# Patient Record
Sex: FEMALE | Race: WHITE
[De-identification: names, ages, dates, MRNs, and addresses within clinical notes are randomized per-mention and may not be internally consistent; named-entity substitution may affect disease eponyms.]

---

## 2017-07-23 NOTE — RADIOLOGY REPORT (SQ)
EXAM DESCRIPTION:  CT HEAD WITHOUT



COMPLETED DATE/TIME:  7/23/2017 3:32 pm



REASON FOR STUDY:  SEIZURE, NO PRIOR H/O SAME



COMPARISON:  None.



TECHNIQUE:  Axial images acquired through the brain without intravenous contrast.  Images reviewed wi
th bone, brain and subdural windows.  Images stored on PACS.

All CT scanners at this facility use dose modulation, iterative reconstruction, and/or weight based d
osing when appropriate to reduce radiation dose to as low as reasonably achievable (ALARA).

[facility]: Dose Right  [facility]: CareDose    [facility]: Dose Right    [facility]: Teradose 4D    [facility]: Smart Technologies



RADIATION DOSE:   mGy.



LIMITATIONS:  None.



FINDINGS:  VENTRICLES: Normal size and contour.

CEREBRUM: No masses.  No hemorrhage.  No midline shift.  Normal gray/white matter differentiation.  N
o evidence for acute infarction.

CEREBELLUM: No masses.  No hemorrhage.  No alteration of density.  No evidence for acute infarction.

EXTRAAXIAL SPACES: No fluid collections.  No masses.

ORBITS AND GLOBE: No intra- or extraconal masses.  Normal contour of globe without masses.

CALVARIUM: No fracture.

PARANASAL SINUSES: No fluid or mucosal thickening.

SOFT TISSUES: No mass or hematoma.

OTHER: No other significant finding.



IMPRESSION:  NORMAL BRAIN CT WITHOUT CONTRAST.



TECHNICAL DOCUMENTATION:  JOB ID:  [id number]

Quality ID # 436: Final reports with documentation of one or more dose reduction techniques (e.g., Au
tomated exposure control, adjustment of the mA and/or kV according to patient size, use of iterative 
reconstruction technique)

 2011 [employer]- All Rights Reserved

## 2017-07-23 NOTE — ER DOCUMENT REPORT
ED Seizure





- General


Chief Complaint: Probable Seizure


Stated Complaint: POSSIBLE SEIZURE


Time Seen by Provider: 07/23/17 15:02


Mode of Arrival: Medic


Information source: Patient, Friend, Emergency Med Personnel


TRAVEL OUTSIDE OF THE [place] IN LAST 30 DAYS: No





- HPI


Patient complains to provider of: First seizure


Quality of pain: No pain


Severity: None


Continued on arrival to ED: No


Can details of seizure be obtained/verified: Yes


Episode witnessed (by whom): Yes - SIGNIFICANT OTHER


Preceding symptoms/context: Recent alcohol intake.  denies: Recent illness/fever

, Recent drug use, Sleep deprivation, Missed dose of meds, Changed meds or 

dosage, Somnolence


History of: denies: Brain tumor or mets, CVA, Hydrocephalus, Migraines, TBI, V/

P shunt


Character of seizure: Complete loss/conscious, Generalized shaking, Staring


Post-ictal symptoms: Confusion.  No: Headache


Injuries: None


Treatment PTA: No: Advanced airway, Ativan, Valium


Associated Symptoms: Confusion, Loss consciousness





- Related Data


Allergies/Adverse Reactions: 


 





No Known Allergies Allergy (Verified 07/23/17 14:57)


 











Past Medical History





- General


Information source: Patient





- Social History


Smoking Status: Unknown if Ever Smoked


Frequency of alcohol use: Occasional


Drug Abuse: None


Lives with: Spouse/Significant other


Family History: None


Patient has suicidal ideation: No


Patient has homicidal ideation: No





- Past Medical History


Cardiac Medical History: Reports: None


Pulmonary Medical History: Reports: None


EENT Medical History: Reports: None


Neurological Medical History: Reports: None


Endocrine Medical History: Reports: None


Renal/ Medical History: Reports: None


Malignancy Medical History: Reports: None


GI Medical History: Reports: None


Musculoskeltal Medical History: Reports None


Psychiatric Medical History: Reports: None


Surgical Hx: Negative





Review of Systems





- Review of Systems


Constitutional: Weakness


EENT: No symptoms reported


Cardiovascular: No symptoms reported


Respiratory: No symptoms reported


Gastrointestinal: No symptoms reported


Genitourinary: No symptoms reported


Female Genitourinary: denies: Pregnant


Musculoskeletal: No symptoms reported


Neurological/Psychological: See HPI





Physical Exam





- Vital signs


Vitals: 


 











Temp Pulse Resp BP Pulse Ox


 


 98.9 F   81   16   135/79 H  99 


 


 07/23/17 14:31  07/23/17 14:31  07/23/17 14:31  07/23/17 14:31  07/23/17 14:31











Interpretation: Normal





- General


General appearance: Appears well, Alert


In distress: None





- HEENT


Head: Normocephalic


Eyes: Normal


Conjunctiva: Normal


Pupils: PERRL


Ears: Normal


Nasal: Normal


Mouth/Lips: Normal


Mucous membranes: Normal


Neck: Normal, Supple





- Respiratory


Respiratory status: No respiratory distress


Breath sounds: Normal





- Cardiovascular


Rhythm: Regular


Heart sounds: Normal auscultation


Murmur: No





- Abdominal


Inspection: Normal


Distension: No distension


Bowel sounds: Normal





- Back


Back: Normal





- Extremities


General upper extremity: Normal inspection


General lower extremity: Normal inspection.  No: Edema





- Neurological


Neuro grossly intact: Yes


Cognition: Normal


Orientation: AAOx4





- Psychological


Associated symptoms: Normal affect, Normal mood





- Skin


Skin Temperature: Warm


Skin Moisture: Dry


Skin Color: Normal


Skin Turgor: Elastic





Course





- Re-evaluation


Re-evalutation: 





07/23/17 18:24


PATIENT DENIES COMPLAINTS.  RESULTS OF WORK-UP DISCUSSED.





- Vital Signs


Vital signs: 


 











Temp Pulse Resp BP Pulse Ox


 


 98.9 F   81   16   135/79 H  99 


 


 07/23/17 14:31  07/23/17 14:31  07/23/17 14:31  07/23/17 14:31  07/23/17 14:31














- Laboratory


Result Diagrams: 


 07/23/17 15:40





 07/23/17 15:40


Laboratory results interpreted by me: 


 











  07/23/17 07/23/17 07/23/17





  15:40 15:40 16:58


 


WBC  10.7 H  


 


RBC  3.66 L  


 


Hgb  11.6 L  


 


Hct  33.8 L  


 


RDW  15.3 H  


 


Seg Neuts % (Manual)  92 H  


 


Band Neutrophils %  2 L  


 


Lymphocytes % (Manual)  6 L  


 


Monocytes % (Manual)  0 L  


 


Abs Neuts (Manual)  10.1 H  


 


Abs Monocytes (Manual)  0.0 L  


 


Sodium   134.8 L 


 


BUN   6 L 


 


Creatine Kinase   168 H 


 


Urine Ketones    TRACE H


 


Ur Leukocyte Esterase    SMALL H














- Diagnostic Test


Radiology reviewed: Image reviewed, Reports reviewed





Discharge





- Discharge


Clinical Impression: 


 Seizure





Condition: Stable


Disposition: HOME, SELF-CARE


Instructions:  New Seizure ([facility])


Additional Instructions: 


REST, DRINK PLENTY OF FLUIDS.


AVOID ALL ALCOHOLIC DRINKS.


AVOID ANY MEDICATIONS NOT PRESCRIBED FOR YOU.


GET PLENTY OF SLEEP. 


FOLLOW UP WITH YOUR PRIMARY CARE PROVIDER OR RETURN TO E.R. IF PROBLEMS.

## 2017-08-01 NOTE — ER DOCUMENT REPORT
ED General





- General


Stated Complaint: POSSIBLE SEIZURE


Time Seen by Provider: 08/01/17 22:41


Notes: 


Please note history is limited as the patient is altered.


This is a 42-year-old female with a history of opioid abuse on Suboxone, and 

history of seizures in the setting of drinking and withdrawal who presents with 

generalized tonic clonic seizure 2 at home once before EMS and 1 after EMS 

arrival.  She was given Versed and ambulance resulting in termination of the 

seizures and depressed mental status.  Her boyfriend said that she was here for 

the same recently and has been drinking up until 2 days ago.


TRAVEL OUTSIDE OF THE [place] IN LAST 30 DAYS: No





- Related Data


Allergies/Adverse Reactions: 


 





No Known Allergies Allergy (Verified 07/23/17 14:57)


 











Past Medical History





- General


Information source: Patient





- Social History


Smoking Status: Current Every Day Smoker


Family History: None





Review of Systems





- Review of Systems


Notes: 


REVIEW OF SYSTEMS


Secondary to altered mental status





PHYSICAL EXAMINATION





General: No acute distress, well-nourished


Head: Atraumatic, normocephalic


ENT: Mouth normal, oropharynx moist, no exudates or tonsillar enlargement sided

, contusion and small laceration with controlled bleeding.


Eyes: Conjunctiva normal, pupils equal, lids normal


Neck: No JVD, supple, no guarding


CVS: Normal rate, regular rhythm, no murmurs


Resp: No resp distress, equal and normal breath sounds bilaterally


GI: Nondistended, soft, no tenderness to palpation, no rebound or guarding


Ext: No deformities, no edema, normal range of motion in upper and lower ext


Back: No CVA or midline TTP


Skin: No rash, warm


Lymphatic: No lymphadeopathy noted


Neuro: Somnolent and snoring.  Opens eyes and localizes pain to sternal rub..





Course





- Re-evaluation


Re-evalutation: 


08/01/17 23:26


Patient presents with seizures generalized tonoclonic 2 in the setting of 

cessation of alcohol.  Per boyfriend she also takes Suboxone and Wellbutrin but 

he has a low suspicion of overdose intentionally.  She did not report any 

suicidal ideation.  This is happened before when she try to stop drinking.  She 

has been to rehab multiple times.  There is no trauma and her glucose is 

normal.  Will do a EKG and metabolic workup.  She will be observed carefully.





Approximately 1 11:20 PM is called to bedside as the patient was having another 

seizure.  She could be having generalized tonic-clonic jerking movements, was 

unresponsive to verbal stimuli and had a desaturation event to 93.  She was 

given 1 mg Ativan with cessation of the seizure.





08/02/17 00:59


Awake now and alert.  She is no nystagmus.  She is able to recount the prior day

's events.  She states that she did stop alcohol and has been in rehab twice 

both in [place] and [place].  Spoke with her, her partner, and her father 

about options of treatment.  Given her ability and access to inpatient 

supervised detox I do not think she merits a Librium taper however she and her 

partner seem motivated to seek i patient rehab so we will give him referrals 

and contact him with social work.





Patient is clinically sober, her workup has been negative thus far and given 

her normal mental status and lack of signs of withdrawal (no tongue 

fasciculations and no tremor) she is stable for discharge home.


I have discussed with the patient there likely diagnosis, aftercare plan, follow

-up plans and my usual and customary return precautions.  They verbalized 

understanding of this.





- Laboratory


Result Diagrams: 


 08/02/17 00:02





 08/01/17 22:39


Laboratory results interpreted by me: 


 











  08/01/17 08/02/17





  22:39 00:02


 


RDW   15.9 H


 


Seg Neutrophils %   89.1 H


 


Lymphocytes %   7.4 L


 


Carbon Dioxide  13 L 


 


Anion Gap  24 H 


 


Glucose  141 H 














Critical Care Note





- Critical Care Note


Total time excluding time spent on procedures (mins): 32


Comments: 


The above patient is critically ill.  Not including procedures, but including 

direct re-evaluations, speaking with patient and/or consultants, interpreting 

results, and documenting, I spent the total amount of minute listed listed 

above on critical care time





Discharge





- Discharge


Clinical Impression: 


Alcohol withdrawal seizure


Qualifiers:


 Complication of substance-induced condition: with delirium Qualified Code(s): 

F10.231 - Alcohol dependence with withdrawal delirium





Condition: Good


Disposition: HOME, SELF-CARE


Instructions:  Alcohol Withdrawl ([facility])


Referrals: 


[facility] [Provider Group] - Follow up in 3-5 days


[facility] [Provider Group] - Follow up in 3-5 days

## 2017-08-01 NOTE — RADIOLOGY REPORT (SQ)
EXAM DESCRIPTION:  CT HEAD WITHOUT



COMPLETED DATE/TIME:  8/1/2017 11:10 pm



REASON FOR STUDY:  sz, r/o bleed



COMPARISON:  7/23/2017



TECHNIQUE:  Axial images acquired through the brain without intravenous contrast.  Images reviewed wi
th bone, brain and subdural windows.  Images stored on PACS.

All CT scanners at this facility use dose modulation, iterative reconstruction, and/or weight based d
osing when appropriate to reduce radiation dose to as low as reasonably achievable (ALARA).

[facility]: Dose Right  [facility]: CareDose    [facility]: Dose Right    [facility]: Teradose 4D    [facility]: [employer]



RADIATION DOSE:  Up-to-date CT equipment and radiation dose reduction techniques were employed. CTDIv
ol: 49.0 mGy. DLP: 979 mGy-cm. mGy.



LIMITATIONS:  None.



FINDINGS:  VENTRICLES: Normal size and contour.

CEREBRUM: No masses.  No hemorrhage.  No midline shift.  Normal gray/white matter differentiation.  N
o evidence for acute infarction.

CEREBELLUM: No masses.  No hemorrhage.  No alteration of density.  No evidence for acute infarction.

EXTRAAXIAL SPACES: No fluid collections.  No masses.

ORBITS AND GLOBE: No intra- or extraconal masses.  Normal contour of globe without masses.

CALVARIUM: No fracture.

PARANASAL SINUSES: No fluid or mucosal thickening.

SOFT TISSUES: No mass or hematoma.

OTHER: No other significant finding.



IMPRESSION:  NORMAL BRAIN CT WITHOUT CONTRAST.



TECHNICAL DOCUMENTATION:  JOB ID:  [id number]

Quality ID # 436: Final reports with documentation of one or more dose reduction techniques (e.g., Au
tomated exposure control, adjustment of the mA and/or kV according to patient size, use of iterative 
reconstruction technique)

 2011 [employer]- All Rights Reserved

## 2017-08-02 NOTE — PDOC H&P
History of Present Illness


Admission Date/PCP: 


  08/02/17 04:46





  





History of Present Illness: 


PAO PAGE is a 42 year old female,Patient was brought to the emergency 

room today for evaluation of altered mental status, in the emergency room she 

had a witnessed seizure.  Patient have  a long history of alcohol abuse, she is 

new to our practice, she was seen last month for the first time to establish 

with us, she stated that she just moved from [place] to [facility].  I spoke to patient's partner/boyfriend,he stated that patient is 

fond of binge drinking that she will go without alcohol for a long while and 

then she will go on binge drinking   the alcohol of choice is hard liquor  

versus rubbing alcohol.  The alcohol level was low, so it seemed that she is 

having alcohol withdrawal syndrome.Very minimal history could be obtained from 

this patient, most of the history is from the boyfriend








Past Medical History


Psychiatric Medical History: Reports: Alcohol Dependency, Depression





Social History


Smoking Status: Current Every Day Smoker


Cigarettes Packs Per Day: 0.5


Number of Years Smoking: 15


Frequency of Alcohol Use: Heavy


Hx Recreational Drug Use: No


Hx Prescription Drug Abuse: Yes





- Advance Directive


Resuscitation Status: Full Code





Family History


Family History: None


Parental Family History Reviewed: Yes


Children Family History Reviewed: Yes


Sibling(s) Family History Reviewed.: Yes





Medication/Allergy


Home Medications: 








Buprenorphine HCl/Naloxone HCl [Suboxone 8 mg-2 mg Sl Film] 1 each SL Q8 08/02/ 17 


Bupropion HCl [Wellbutrin Sr 150 mg Tablet] 1 tab PO Q12 08/02/17 


Buspirone HCl [Buspar 30 mg Tablet] 1 tab PO DAILY 08/02/17 


Gabapentin [Neurontin] 600 mg PO Q6 08/02/17 


Mirtazapine 30 mg PO QHS 08/02/17 


Pregabalin [Lyrica 50 Mg Capsule] 50 mg PO Q12 08/02/17 


Topiramate [Topamax] 50 mg PO Q12 08/02/17 








Allergies/Adverse Reactions: 


 





No Known Allergies Allergy (Verified 08/02/17 09:12)


 











Review of Systems


ROS unobtainable: Due to mental status





Physical Exam


Vital Signs: 


 











Temp Pulse Resp BP Pulse Ox


 


 99.5 F   94   16   134/79 H  100 


 


 08/02/17 15:20  08/02/17 15:20  08/02/17 15:20  08/02/17 15:20  08/02/17 15:20








 Intake & Output











 08/01/17 08/02/17 08/03/17





 06:59 06:59 06:59


 


Weight  67.2 kg 68.5 kg











General appearance: PRESENT: other - Stuporous


Head exam: PRESENT: atraumatic, normocephalic


Eye exam: PRESENT: PERRLA.  ABSENT: scleral icterus


Ear exam: PRESENT: normal external ear exam


Mouth exam: PRESENT: moist, tongue midline


Neck exam: PRESENT: full ROM


Respiratory exam: PRESENT: clear to auscultation anamaria


Cardiovascular exam: PRESENT: RRR, +S1, +S2


GI/Abdominal exam: PRESENT: normal bowel sounds, soft


Rectal exam: PRESENT: deferred


Neurological exam: PRESENT: altered





Results


Laboratory Results: 


 











  08/02/17





  15:52


 


Lactic Acid  1.4











Impressions: 


 





Head CT  08/01/17 22:41


IMPRESSION:  NORMAL BRAIN CT WITHOUT CONTRAST.


 














Assessment & Plan





- Diagnosis


(1) Alcohol withdrawal seizure


Qualifiers: 


     Complication of substance-induced condition: with delirium     Qualified 

Code(s): F10.231 - Alcohol dependence with withdrawal delirium  


Is this a current diagnosis for this admission?: YesPlan: 


Patient is admitted for management she will be treated with intravenous 

lorazepam, for 24 hours then we reevaluate.

## 2017-08-03 NOTE — PDOC PROGRESS REPORT
Subjective


Progress Note for:: 08/03/17


Subjective:: 


Patient was seen by the bedside she is more alert, awake and responsive today, 

she was seen by the psychiatrist as well today.  She was n.p.o. since admission

, she can eat regular diet, hopefully discharge home  tomorrow





Physical Exam


Vital Signs: 


 











Temp Pulse Resp BP Pulse Ox


 


 98.7 F   84   16   124/68   100 


 


 08/03/17 19:31  08/03/17 20:04  08/03/17 19:31  08/03/17 19:31  08/03/17 19:31








 Intake & Output











 08/02/17 08/03/17 08/04/17





 06:59 06:59 06:59


 


Intake Total  2561 700


 


Output Total  750 


 


Balance  1811 700


 


Weight 67.2 kg 70.9 kg 











General appearance: PRESENT: no acute distress, well-developed, well-nourished


Head exam: PRESENT: atraumatic, normocephalic


Eye exam: PRESENT: conjunctiva pink, EOMI, PERRLA


Ear exam: PRESENT: normal external ear exam


Mouth exam: PRESENT: moist, tongue midline


Neck exam: PRESENT: full ROM


Respiratory exam: PRESENT: clear to auscultation anamaria


Cardiovascular exam: PRESENT: RRR, +S1, +S2


Pulses: PRESENT: normal dorsalis pedis pul, +2 pedal pulses bilateral


Vascular exam: PRESENT: normal capillary refill


GI/Abdominal exam: PRESENT: normal bowel sounds, soft


Rectal exam: PRESENT: deferred


Neurological exam: PRESENT: alert, CN II-XII grossly intact


Psychiatric exam: PRESENT: appropriate affect, normal mood


Skin exam: PRESENT: dry, intact, warm





Results


Laboratory Results: 


 





 08/03/17 05:25 





 08/03/17 05:25 





 











  08/03/17 08/03/17





  05:25 05:25


 


WBC  9.8 


 


RBC  3.51 L 


 


Hgb  11.3 L 


 


Hct  32.8 L 


 


MCV  94 


 


MCH  32.2 


 


MCHC  34.4 


 


RDW  15.8 H 


 


Plt Count  221 


 


Seg Neutrophils %  78.5 H 


 


Lymphocytes %  16.1 


 


Monocytes %  4.8 


 


Eosinophils %  0.2 


 


Basophils %  0.4 


 


Absolute Neutrophils  7.7 


 


Absolute Lymphocytes  1.6 


 


Absolute Monocytes  0.5 


 


Absolute Eosinophils  0.0 


 


Absolute Basophils  0.0 


 


Sodium   134.8 L


 


Potassium   4.0


 


Chloride   102


 


Carbon Dioxide   23


 


Anion Gap   10


 


BUN   8


 


Creatinine   0.63


 


Est GFR ( Amer)   > 60


 


Est GFR (Non-Af Amer)   > 60


 


Glucose   108


 


Calcium   9.0


 


Total Bilirubin   0.6


 


AST   15


 


ALT   19


 


Alkaline Phosphatase   59


 


Total Protein   6.5


 


Albumin   3.9











Impressions: 


 





Head CT  08/01/17 22:41


IMPRESSION:  NORMAL BRAIN CT WITHOUT CONTRAST.


 














Assessment & Plan





- Diagnosis


(1) Alcohol withdrawal seizure


Qualifiers: 


     Complication of substance-induced condition: with delirium     Qualified 

Code(s): F10.231 - Alcohol dependence with withdrawal delirium  


Is this a current diagnosis for this admission?: Yes

## 2017-08-03 NOTE — PSYCHOLOGICAL NOTE
Psych Note





- Psych Note


Psych Note: 


This is a 42-year-old female with a history of opioid abuse on Suboxone, and 

history of seizures in the setting of drinking and withdrawal who presents with 

generalized tonic clonic seizure 2 at home once before EMS and 1 after EMS 

arrival.  Psychiatric evaluation was requested for concern of alcohol abuse.





Patient stated she would like information on both in patient and out patient 

substance abuse treatment for alcohol.  Patient denies wanted to harm herself 

or killing herself.  Patient stated she does have a drinking problem and has 

seizures from withdrawal.  Patient  disclosed her family is also helping her 

plan her treatment.  





Patient is alert and orientated to person, place, time and circumstance.  Mood 

is lethargic with flat affect;  patient is currently receiving Ativan for 

treatment of alcohol withdrawal. Patient denies suicidal and homicidal 

ideation.  Delusions were absent and behaviour is congruent with an intact 

reality based presentation (i.e. organized, linear,rational thinking).  Eye 

contact was well maintained. Attention and concentration was fair.  Insight, 

judgement and impulse control appears to be fair. 





303.90 (F10.20) Alcohol Abuse; severe 





Impression/plan: Patient is considered psychiatrically cleared.  Patient does 

not meet IVC criteria per [place] GS 122C.  Patient denies suicidal and homicidal 

ideation. Delusions were absent and behaviour is congruent with an intact 

reality based presentation (i.e. organized, linear,rational thinking).  Patient 

is recommended to receive substance abuse treatment; resources were provided.  

Dr. Whiteside was consulted on the care and management of this patient.

## 2017-08-04 NOTE — PDOC DISCHARGE SUMMARY
General





- Admit/Disc Date/PCP


Admission Date/Primary Care Provider: 


  08/02/17 04:46





  





Discharge Date: 08/04/17





- Discharge Diagnosis


(1) Alcohol withdrawal seizure


Is this a current diagnosis for this admission?: Yes








- Additional Information


Resuscitation Status: Full Code


Discharge Diet: Regular


Discharge Activity: Activity As Tolerated


Home Medications: 








Buprenorphine HCl/Naloxone HCl [Suboxone 8 mg-2 mg Sl Film] 1 each SL Q8 08/02/ 17 


Bupropion HCl [Wellbutrin Sr 150 mg Tablet] 1 tab PO Q12 08/02/17 


Buspirone HCl [Buspar 30 mg Tablet] 1 tab PO DAILY 08/02/17 


Mirtazapine 30 mg PO QHS 08/02/17 


Pregabalin [Lyrica 50 mg Capsule] 50 mg PO Q12 08/02/17 


Topiramate [Topamax] 50 mg PO Q12 08/02/17 











History of Present Illness


History of Present Illness: 


PAO PAGE is a 42 year old female,Patient was brought to the emergency 

room today for evaluation of altered mental status, in the emergency room she 

had a witnessed seizure.  Patient have  a long history of alcohol abuse, she is 

new to our practice, she was seen last month for the first time to establish 

with us, she stated that she just moved from [place] to [facility].  I spoke to patient's partner/boyfriend,he stated that patient is 

fond of binge drinking that she will go without alcohol for a long while and 

then she will go on binge drinking   the alcohol of choice is hard liquor  

versus rubbing alcohol.  The alcohol level was low, so it seemed that she is 

having alcohol withdrawal syndrome.Very minimal history could be obtained from 

this patient, most of the history is from the [facility]








Hospital Course


Hospital Course: 


Patient was admitted for the management of alcohol related  seizure, she was 

treated with lorazepam intravenously, was kept n.p.o. for 24 hours.  She was 

then transitioned to as needed lorazepam, she is stable enough today she is 

awake and responsive she will be discharged home.





Physical Exam


Vital Signs: 


 











Temp Pulse Resp BP Pulse Ox


 


 98.4 F   64   16   129/57 H  100 


 


 08/04/17 17:50  08/04/17 17:50  08/04/17 17:50  08/04/17 17:50  08/04/17 17:50








 Intake & Output











 08/03/17 08/04/17 08/05/17





 06:59 06:59 06:59


 


Intake Total 2561 1806 1300


 


Output Total 750 0 


 


Balance 1811 1806 1300


 


Weight 70.9 kg 70.9 kg 











General appearance: PRESENT: no acute distress, well-developed, well-nourished


Head exam: PRESENT: atraumatic, normocephalic


Eye exam: PRESENT: conjunctiva pink, EOMI, PERRLA


Ear exam: PRESENT: normal external ear exam


Mouth exam: PRESENT: moist, tongue midline


Neck exam: PRESENT: full ROM


Cardiovascular exam: PRESENT: RRR, +S1, +S2


Pulses: PRESENT: normal dorsalis pedis pul, +2 pedal pulses bilateral


Vascular exam: PRESENT: normal capillary refill


GI/Abdominal exam: PRESENT: normal bowel sounds, soft


Rectal exam: PRESENT: deferred


Neurological exam: PRESENT: alert, awake, oriented to person, oriented to place

, oriented to time, oriented to situation, CN II-XII grossly intact


Psychiatric exam: PRESENT: appropriate affect, normal mood


Skin exam: PRESENT: dry, intact, warm





Results


Laboratory Results: 


 





 08/03/17 05:25 





 08/03/17 05:25 








Impressions: 


 





Head CT  08/01/17 22:41


IMPRESSION:  NORMAL BRAIN CT WITHOUT CONTRAST.

## 2018-10-27 ENCOUNTER — HOSPITAL ENCOUNTER (INPATIENT)
Dept: HOSPITAL 62 - ER | Age: 43
LOS: 3 days | Discharge: LEFT BEFORE DISCHARGE | DRG: 894 | End: 2018-10-30
Attending: INTERNAL MEDICINE | Admitting: INTERNAL MEDICINE
Payer: COMMERCIAL

## 2018-10-27 DIAGNOSIS — F10.231: Primary | ICD-10-CM
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
## 2018-10-27 LAB
ADD MANUAL DIFF: NO
ALBUMIN SERPL-MCNC: 3.6 G/DL (ref 3.5–5)
ALP SERPL-CCNC: 51 U/L (ref 38–126)
ALT SERPL-CCNC: 12 U/L (ref 9–52)
ANION GAP SERPL CALC-SCNC: 13 MMOL/L (ref 5–19)
APAP SERPL-MCNC: < 10 UG/ML (ref 10–30)
APPEARANCE UR: CLEAR
APTT PPP: (no result) S
AST SERPL-CCNC: 19 U/L (ref 14–36)
BARBITURATES UR QL SCN: NEGATIVE
BASOPHILS # BLD AUTO: 0 10^3/UL (ref 0–0.2)
BASOPHILS NFR BLD AUTO: 0.5 % (ref 0–2)
BILIRUB DIRECT SERPL-MCNC: 0.1 MG/DL (ref 0–0.4)
BILIRUB SERPL-MCNC: 0.2 MG/DL (ref 0.2–1.3)
BILIRUB UR QL STRIP: NEGATIVE
BUN SERPL-MCNC: 6 MG/DL (ref 7–20)
CALCIUM: 8.7 MG/DL (ref 8.4–10.2)
CHLORIDE SERPL-SCNC: 110 MMOL/L (ref 98–107)
CO2 SERPL-SCNC: 21 MMOL/L (ref 22–30)
EOSINOPHIL # BLD AUTO: 0 10^3/UL (ref 0–0.6)
EOSINOPHIL NFR BLD AUTO: 0.1 % (ref 0–6)
ERYTHROCYTE [DISTWIDTH] IN BLOOD BY AUTOMATED COUNT: 15.2 % (ref 11.5–14)
ETHANOL SERPL-MCNC: < 10 MG/DL
GLUCOSE SERPL-MCNC: 117 MG/DL (ref 75–110)
GLUCOSE UR STRIP-MCNC: NEGATIVE MG/DL
HCT VFR BLD CALC: 35.1 % (ref 36–47)
HGB BLD-MCNC: 12.1 G/DL (ref 12–15.5)
KETONES UR STRIP-MCNC: NEGATIVE MG/DL
LYMPHOCYTES # BLD AUTO: 0.6 10^3/UL (ref 0.5–4.7)
LYMPHOCYTES NFR BLD AUTO: 8 % (ref 13–45)
MCH RBC QN AUTO: 31.3 PG (ref 27–33.4)
MCHC RBC AUTO-ENTMCNC: 34.4 G/DL (ref 32–36)
MCV RBC AUTO: 91 FL (ref 80–97)
METHADONE UR QL SCN: (no result)
MONOCYTES # BLD AUTO: 0.2 10^3/UL (ref 0.1–1.4)
MONOCYTES NFR BLD AUTO: 3 % (ref 3–13)
NEUTROPHILS # BLD AUTO: 6.6 10^3/UL (ref 1.7–8.2)
NEUTS SEG NFR BLD AUTO: 88.4 % (ref 42–78)
NITRITE UR QL STRIP: NEGATIVE
PCP UR QL SCN: NEGATIVE
PH UR STRIP: 7 [PH] (ref 5–9)
PLATELET # BLD: 230 10^3/UL (ref 150–450)
POTASSIUM SERPL-SCNC: 3.8 MMOL/L (ref 3.6–5)
PROT SERPL-MCNC: 6.3 G/DL (ref 6.3–8.2)
PROT UR STRIP-MCNC: NEGATIVE MG/DL
RBC # BLD AUTO: 3.86 10^6/UL (ref 3.72–5.28)
SALICYLATES SERPL-MCNC: < 1 MG/DL (ref 2–20)
SODIUM SERPL-SCNC: 144.4 MMOL/L (ref 137–145)
SP GR UR STRIP: 1.01
TOTAL CELLS COUNTED % (AUTO): 100 %
URINE AMPHETAMINES SCREEN: NEGATIVE
URINE BENZODIAZEPINES SCREEN: (no result)
URINE COCAINE SCREEN: NEGATIVE
URINE MARIJUANA (THC) SCREEN: NEGATIVE
UROBILINOGEN UR-MCNC: NEGATIVE MG/DL (ref ?–2)
WBC # BLD AUTO: 7.5 10^3/UL (ref 4–10.5)

## 2018-10-27 PROCEDURE — 96375 TX/PRO/DX INJ NEW DRUG ADDON: CPT

## 2018-10-27 PROCEDURE — 96365 THER/PROPH/DIAG IV INF INIT: CPT

## 2018-10-27 PROCEDURE — 70450 CT HEAD/BRAIN W/O DYE: CPT

## 2018-10-27 PROCEDURE — 93010 ELECTROCARDIOGRAM REPORT: CPT

## 2018-10-27 PROCEDURE — 80307 DRUG TEST PRSMV CHEM ANLYZR: CPT

## 2018-10-27 PROCEDURE — 81001 URINALYSIS AUTO W/SCOPE: CPT

## 2018-10-27 PROCEDURE — 36415 COLL VENOUS BLD VENIPUNCTURE: CPT

## 2018-10-27 PROCEDURE — 80048 BASIC METABOLIC PNL TOTAL CA: CPT

## 2018-10-27 PROCEDURE — 83735 ASSAY OF MAGNESIUM: CPT

## 2018-10-27 PROCEDURE — 3E0F73Z INTRODUCTION OF ANTI-INFLAMMATORY INTO RESPIRATORY TRACT, VIA NATURAL OR ARTIFICIAL OPENING: ICD-10-PCS | Performed by: INTERNAL MEDICINE

## 2018-10-27 PROCEDURE — 80053 COMPREHEN METABOLIC PANEL: CPT

## 2018-10-27 PROCEDURE — 85025 COMPLETE CBC W/AUTO DIFF WBC: CPT

## 2018-10-27 PROCEDURE — 93005 ELECTROCARDIOGRAM TRACING: CPT

## 2018-10-27 PROCEDURE — 84703 CHORIONIC GONADOTROPIN ASSAY: CPT

## 2018-10-27 PROCEDURE — 99285 EMERGENCY DEPT VISIT HI MDM: CPT

## 2018-10-27 PROCEDURE — 82962 GLUCOSE BLOOD TEST: CPT

## 2018-10-27 RX ADMIN — GABAPENTIN SCH MG: 400 CAPSULE ORAL at 17:48

## 2018-10-27 RX ADMIN — HEPARIN SODIUM SCH: 5000 INJECTION, SOLUTION INTRAVENOUS; SUBCUTANEOUS at 14:29

## 2018-10-27 RX ADMIN — SODIUM CHLORIDE PRN MLS/HR: 9 INJECTION, SOLUTION INTRAVENOUS at 10:07

## 2018-10-27 RX ADMIN — DOCUSATE SODIUM SCH MG: 100 CAPSULE, LIQUID FILLED ORAL at 09:58

## 2018-10-27 RX ADMIN — HEPARIN SODIUM SCH UNIT: 5000 INJECTION, SOLUTION INTRAVENOUS; SUBCUTANEOUS at 21:14

## 2018-10-27 RX ADMIN — DOCUSATE SODIUM SCH MG: 100 CAPSULE, LIQUID FILLED ORAL at 17:48

## 2018-10-27 RX ADMIN — LORAZEPAM PRN MG: 2 INJECTION INTRAMUSCULAR; INTRAVENOUS at 11:59

## 2018-10-27 RX ADMIN — HEPARIN SODIUM SCH UNIT: 5000 INJECTION, SOLUTION INTRAVENOUS; SUBCUTANEOUS at 06:47

## 2018-10-27 RX ADMIN — SODIUM CHLORIDE PRN MLS/HR: 9 INJECTION, SOLUTION INTRAVENOUS at 14:28

## 2018-10-27 RX ADMIN — LORAZEPAM PRN MG: 2 INJECTION INTRAMUSCULAR; INTRAVENOUS at 19:42

## 2018-10-27 RX ADMIN — LORAZEPAM PRN MG: 2 INJECTION INTRAMUSCULAR; INTRAVENOUS at 16:21

## 2018-10-27 NOTE — EKG REPORT
SEVERITY:- BORDERLINE ECG -

SINUS RHYTHM

ATRIAL PREMATURE COMPLEX

BORDERLINE PROLONGED QT INTERVAL

:

Confirmed by: Konrad Cornelius 27-Oct-2018 14:18:36

## 2018-10-27 NOTE — PDOC H&P
History of Present Illness


Admission Date/PCP: 


  





  AIRAM PEREZ





Patient complains of: Seizure


History of Present Illness: 


LINWOOD PAGE is a 43 year old female with a past medical history of alcohol 

binging, alcohol withdrawal seizure and depression.  History is obtained by the 

record as she is sedated.  Patient presents after 3 seizures following abrupt 

alcohol cessation, last drink 48 hours ago.  EMS witnessed a seizure for which 

she received Versed, she was combative in postictal state prompting Haldol and 

Benadryl.  She denies fever, headache, recent illness.  She admits to remote 

discontinuation of Keppra.


In the emergency room she has another witnessed seizure with urinary 

incontinence and tongue biting, prompting 2 mg of Ativan and subsequently 

aborted within 1 minute.  





Past Medical History


Psychiatric Medical History: Reports: Alcohol Dependency, Depression





Social History


Information Source: Patient, Emergency Med Personnel, [facility] Records


Smoking Status: Unknown if Ever Smoked


Frequency of Alcohol Use: Heavy


Hx Recreational Drug Use: No


Hx Prescription Drug Abuse: Yes





- Advance Directive


Resuscitation Status: Full Code





Family History


Family History: Other - Unobtainable


Parental Family History Reviewed: No - Unobtainable


Children Family History Reviewed: No


Sibling(s) Family History Reviewed.: No





Medication/Allergy


Home Medications: 








Buprenorphine HCl/Naloxone HCl [Suboxone 8 mg-2 mg Sl Film] 1 each SL Q8 08/02/ 17 


Bupropion HCl [Wellbutrin Sr 150 mg Tablet] 1 tab PO Q12 08/02/17 


Buspirone HCl [Buspar 30 mg Tablet] 1 tab PO DAILY 08/02/17 


Mirtazapine 30 mg PO QHS 08/02/17 


Pregabalin [Lyrica 50 mg Capsule] 50 mg PO Q12 08/02/17 


Topiramate [Topamax] 50 mg PO Q12 08/02/17 








Allergies/Adverse Reactions: 


 





No Known Allergies Allergy (Verified 08/02/17 09:12)


 











Review of Systems


ROS unobtainable: Due to mental status





Physical Exam


Vital Signs: 


 











Temp Pulse Resp BP Pulse Ox


 


       16   118/69   99 


 


       10/27/18 06:01  10/27/18 06:01  10/27/18 05:01








 Intake & Output











 10/25/18 10/26/18 10/27/18





 11:59 11:59 11:59


 


Intake Total   100


 


Balance   100











General appearance: PRESENT: disheveled, mild distress.  ABSENT: no acute 

distress, cooperative


Head exam: PRESENT: atraumatic, normocephalic


Eye exam: PRESENT: conjunctiva pink, EOMI, PERRLA.  ABSENT: scleral icterus


Ear exam: PRESENT: normal external ear exam


Mouth exam: PRESENT: moist, tongue midline


Neck exam: ABSENT: carotid bruit, JVD, lymphadenopathy, thyromegaly


Respiratory exam: PRESENT: clear to auscultation anamaria.  ABSENT: rales, rhonchi, 

wheezes


Cardiovascular exam: PRESENT: RRR.  ABSENT: diastolic murmur, rubs, systolic 

murmur


Pulses: PRESENT: normal dorsalis pedis pul


Vascular exam: PRESENT: normal capillary refill


GI/Abdominal exam: PRESENT: normal bowel sounds, soft.  ABSENT: distended, 

guarding, mass, organolmegaly, rebound, tenderness


Rectal exam: PRESENT: deferred


Extremities exam: PRESENT: full ROM.  ABSENT: calf tenderness, clubbing, pedal 

edema


Neurological exam: PRESENT: altered, CN II-XII grossly intact


Psychiatric exam: PRESENT: appropriate affect, normal mood.  ABSENT: homicidal 

ideation, suicidal ideation


Skin exam: PRESENT: dry, intact, warm.  ABSENT: cyanosis, rash





Results


Laboratory Results: 


 





 10/27/18 03:35 





 10/27/18 03:35 





 











  10/27/18 10/27/18 10/27/18





  03:35 03:35 03:35


 


WBC  7.5  


 


RBC  3.86  


 


Hgb  12.1  


 


Hct  35.1 L  


 


MCV  91  


 


MCH  31.3  


 


MCHC  34.4  


 


RDW  15.2 H  


 


Plt Count  230  


 


Seg Neutrophils %  88.4 H  


 


Lymphocytes %  8.0 L  


 


Monocytes %  3.0  


 


Eosinophils %  0.1  


 


Basophils %  0.5  


 


Absolute Neutrophils  6.6  


 


Absolute Lymphocytes  0.6  


 


Absolute Monocytes  0.2  


 


Absolute Eosinophils  0.0  


 


Absolute Basophils  0.0  


 


Sodium   144.4 


 


Potassium   3.8 


 


Chloride   110 H 


 


Carbon Dioxide   21 L 


 


Anion Gap   13 


 


BUN   6 L 


 


Creatinine   0.70 


 


Est GFR ( Amer)   > 60 


 


Est GFR (Non-Af Amer)   > 60 


 


Glucose   117 H 


 


Calcium   8.7 


 


Magnesium   2.1 


 


Total Bilirubin   0.2 


 


AST   19 


 


ALT   12 


 


Alkaline Phosphatase   51 


 


Total Protein   6.3 


 


Albumin   3.6 


 


Serum HCG, Qual    NEGATIVE











Impressions: 


 





Head CT  10/27/18 03:42


IMPRESSION: No acute intracranial abnormality. 


 














Assessment & Plan





- Diagnosis


(1) Alcohol dependence


Is this a current diagnosis for this admission?: Yes   


Plan: 


Supportive care, thiamine and folate, Ativan as needed








(2) Alcohol withdrawal seizure


Qualifiers: 


   Complication of substance-induced condition: with delirium   Qualified Code(s

): F10.231 - Alcohol dependence with withdrawal delirium   


Is this a current diagnosis for this admission?: Yes   


Plan: 


Keppra initiated, Ativan as needed








- Time


Time Spent: 30 to 50 Minutes





- Inpatient Certification


Medical Necessity: Need Close Monitoring Due to Risk of Patient Decompensation

## 2018-10-27 NOTE — RADIOLOGY REPORT (SQ)
CT head without contrast on  10/27/2018 at 3:56 AM



CLINICAL INDICATION: Seizure



TECHNIQUE: Multiple axial images are obtained throughout the head

without the administration of contrast. This exam was performed

according to our departmental dose-optimization program, which

includes automated exposure control, adjustment of the mA and/or

kV according to patient size and/or use of iterative

reconstruction technique. 

Total DLP is 1096.98 mGy*cm.



COMPARISON: None currently available



FINDINGS:   There is no hydrocephalus. There is no CT evidence of

acute infarct. There is no hemorrhage. There are no abnormal

extra-axial fluid collections. There is no mass, mass effect or

midline shift. No bony abnormality is noted.



IMPRESSION: No acute intracranial abnormality.

## 2018-10-27 NOTE — ER DOCUMENT REPORT
ED General





- General


Stated Complaint: POSSIBLE SEIZURE


Time Seen by Provider: 10/27/18 03:37


Notes: 





Patient is a 43-year-old female presents with complaint of seizure.  Patient's 

had 3 seizures today.  One was witnessed by the paramedics.  They gave her 10 

of Versed IM as well as 50 Benadryl and 5 of Haldol as patient was combative 

after the seizure.  No recent fevers or infections.  Patient is able to 

communicate with me well at this time.  She says that the only previous history 

of seizures was due to alcohol withdrawal.  She does admit to recent binge 

drinking which she stopped 2 days ago.  She thinks these are probably related 

to alcohol withdrawal as well.  She supposed to take Keppra for seizures but 

says that she has not taken out a long time.  Looked up her previous admission 

for the same thing where she was seen by Dr. Rodriguez in the past but patient 

says she only saw him one time is not seen him in over a year and does not have 

a current primary care doctor.  She denies any recent fevers or infections.  

She denies any trauma or injuries.


TRAVEL OUTSIDE OF THE [place] IN LAST 30 DAYS: No





- Related Data


Allergies/Adverse Reactions: 


 





No Known Allergies Allergy (Verified 08/02/17 09:12)


 











Past Medical History





- Social History


Smoking Status: Unknown if Ever Smoked


Frequency of alcohol use: Heavy


Drug Abuse: None


Family History: None


Psychiatric Medical History: Reports: Hx Depression





Review of Systems





- Review of Systems


Notes: 





My Normal Review Basic





REVIEW OF SYSTEMS:


CONSTITUTIONAL :  Denies fever,  chills, or sweats.  Denies recent illness.


EENT:   Denies eye, ear, throat, or mouth pain or symptoms.  Denies nasal or 

sinus congestion.


CARDIOVASCULAR:  Denies chest pain.


RESPIRATORY:  Denies cough, cold, or chest congestion.  Denies shortness of 

breath, difficulty breathing, or wheezing.


GASTROINTESTINAL:  Denies abdominal pain.  Denies nausea, vomiting, or 

diarrhea. 


MUSCULOSKELETAL:  Denies neck or back pain or joint pain or swelling.


SKIN:   Denies rash or skin lesions.


NEUROLOGICAL: Seizure followed by altered mental status.


ALL OTHER SYSTEMS REVIEWED AND NEGATIVE.





Physical Exam





- Notes


Notes: 





General Appearance: Well nourished, lately somnolent, cooperative, no acute 

distress, no obvious discomfort.  Well-appearing.


Vitals: reviewed, See vital signs table.


Head: no swelling or tenderness to the head


Eyes: PERRL, EOMI, Conjuctiva clear


Mouth: No decreasd moisture


Lungs: No wheezing, No rales, No rhonci, No accessory muscle use, good air 

exchange bilaterally.


Heart: Normal rate, Regular rythm, No murmur, no rub


Abdomen: Normal BS, soft, No rigidity, No abdominal tenderness, No guarding, no 

rebound, no abdominal masses, no organomegaly


Extremities: strength 5/5 in all extremities, good pulses in all extremities, 

no swelling or tenderness in the extremities, no edema.


Skin: warm, dry, appropriate color, no rash


Neuro: speech clear, oriented x 2, lightly somnolent affect, responds 

appropriately to questions.  Cranial nerves II through XII are intact.  Patient 

able to move all 4 extremities without difficulty.  Distal sensation intact.





Course





- Re-evaluation


Re-evalutation: 





10/27/18 04:29


Patient has had another seizure.  Last approximately 30 seconds.  With full 

body tonic-clonic seizure.  Patient is given 2 mg of Ativan as she still having 

some intermittent clenching of her face.  This has resolved with the Ativan.  

We will give her Keppra as she has been on Keppra in the past and has not been 

taking it per her own history. 


10/27/18 05:26








Patient has not had any further seizures since receiving the Keppra.  Heart 

rate is currently right around 100.  Patient is always somewhat as expected.  

She had a seizure followed by Ativan and now is receiving Keppra.  She still 

able to wake up and answer my questions.  Informed I feel that it is best if we 

admit her to the hospital for further observation and to make sure he does not 

have any further seizures.  Per her own history it sounds as if her seizures 

occur with alcohol withdrawal.  There is also some noncompliance with the 

Keppra.  Patient is agreeable to this.  I did speak with Dr. Escamilla who agrees 

to evaluate the patient for admission.





Dictation of this chart was performed using voice recognition software; 

therefore, there may be some unintended grammatical errors.





- Laboratory


Result Diagrams: 


 10/27/18 03:35





 10/27/18 03:35


Laboratory results interpreted by me: 


 











  10/27/18 10/27/18





  03:35 03:35


 


Hct  35.1 L 


 


RDW  15.2 H 


 


Seg Neutrophils %  88.4 H 


 


Lymphocytes %  8.0 L 


 


Chloride   110 H


 


Carbon Dioxide   21 L


 


BUN   6 L


 


Glucose   117 H


 


Salicylates   < 1.0 L


 


Acetaminophen   < 10 L














Discharge





- Discharge


Referrals: 


AIRAM PEREZ MD [Primary Care Provider] - Follow up as needed

## 2018-10-28 LAB
ADD MANUAL DIFF: NO
ANION GAP SERPL CALC-SCNC: 13 MMOL/L (ref 5–19)
BASOPHILS # BLD AUTO: 0 10^3/UL (ref 0–0.2)
BASOPHILS NFR BLD AUTO: 0.1 % (ref 0–2)
BUN SERPL-MCNC: 5 MG/DL (ref 7–20)
CALCIUM: 8.9 MG/DL (ref 8.4–10.2)
CHLORIDE SERPL-SCNC: 112 MMOL/L (ref 98–107)
CO2 SERPL-SCNC: 18 MMOL/L (ref 22–30)
EOSINOPHIL # BLD AUTO: 0 10^3/UL (ref 0–0.6)
EOSINOPHIL NFR BLD AUTO: 0 % (ref 0–6)
ERYTHROCYTE [DISTWIDTH] IN BLOOD BY AUTOMATED COUNT: 15.3 % (ref 11.5–14)
GLUCOSE SERPL-MCNC: 97 MG/DL (ref 75–110)
HCT VFR BLD CALC: 30.6 % (ref 36–47)
HGB BLD-MCNC: 10.6 G/DL (ref 12–15.5)
LYMPHOCYTES # BLD AUTO: 0.9 10^3/UL (ref 0.5–4.7)
LYMPHOCYTES NFR BLD AUTO: 10.7 % (ref 13–45)
MCH RBC QN AUTO: 31.1 PG (ref 27–33.4)
MCHC RBC AUTO-ENTMCNC: 34.7 G/DL (ref 32–36)
MCV RBC AUTO: 90 FL (ref 80–97)
MONOCYTES # BLD AUTO: 0.3 10^3/UL (ref 0.1–1.4)
MONOCYTES NFR BLD AUTO: 3.8 % (ref 3–13)
NEUTROPHILS # BLD AUTO: 7.1 10^3/UL (ref 1.7–8.2)
NEUTS SEG NFR BLD AUTO: 85.4 % (ref 42–78)
PLATELET # BLD: 198 10^3/UL (ref 150–450)
POTASSIUM SERPL-SCNC: 3.8 MMOL/L (ref 3.6–5)
RBC # BLD AUTO: 3.41 10^6/UL (ref 3.72–5.28)
SODIUM SERPL-SCNC: 142.5 MMOL/L (ref 137–145)
TOTAL CELLS COUNTED % (AUTO): 100 %
WBC # BLD AUTO: 8.4 10^3/UL (ref 4–10.5)

## 2018-10-28 RX ADMIN — GABAPENTIN SCH MG: 400 CAPSULE ORAL at 14:59

## 2018-10-28 RX ADMIN — HEPARIN SODIUM SCH UNIT: 5000 INJECTION, SOLUTION INTRAVENOUS; SUBCUTANEOUS at 21:17

## 2018-10-28 RX ADMIN — DULOXETINE SCH MG: 30 CAPSULE, DELAYED RELEASE ORAL at 09:03

## 2018-10-28 RX ADMIN — DOCUSATE SODIUM SCH: 100 CAPSULE, LIQUID FILLED ORAL at 17:41

## 2018-10-28 RX ADMIN — FOLIC ACID SCH MLS/HR: 5 INJECTION, SOLUTION INTRAMUSCULAR; INTRAVENOUS; SUBCUTANEOUS at 17:34

## 2018-10-28 RX ADMIN — GABAPENTIN SCH MG: 400 CAPSULE ORAL at 09:03

## 2018-10-28 RX ADMIN — HEPARIN SODIUM SCH UNIT: 5000 INJECTION, SOLUTION INTRAVENOUS; SUBCUTANEOUS at 09:03

## 2018-10-28 RX ADMIN — GABAPENTIN SCH MG: 400 CAPSULE ORAL at 17:33

## 2018-10-28 RX ADMIN — DOCUSATE SODIUM SCH: 100 CAPSULE, LIQUID FILLED ORAL at 10:28

## 2018-10-28 NOTE — PDOC PROGRESS REPORT
Subjective


Progress Note for:: 10/28/18


Subjective:: 


Ms. Varela is a 43 year old female with a past medical history of alcohol 

binging, alcohol withdrawal seizure, questionable history of seizure disorder (

was on Keppra before?) and depression who was admitted for possible alcohol 

withdrawal seizure.





In the ER, patient admitted to binge drinking 48 hrs prior to presentation 

before developing seizures. She had witnessed seizure in the ER. 





No acute event overnight but patient did remain fidgetty and restless but not 

combative. Yesterday afternoon, she was oriented to person and place but not 

time. She required 6 mg of ativan overnight. Upon encounter this morning, she 

is oriented to person but does answer when asked about time and place. She does 

deny having any headache, chest pain, neck pain, SOB, chills or fever. No 

nausea or vomiting. She had 2 large BM overnight with soft, non-watery stools.








Reason For Visit: 


ETOH WITHDRAWAL SZ








Physical Exam


Vital Signs: 


 











Temp Pulse Resp BP Pulse Ox


 


 98.7 F   104 H  14   140/81 H  95 


 


 10/28/18 07:34  10/28/18 09:00  10/28/18 09:00  10/28/18 07:34  10/28/18 09:00








 Intake & Output











 10/27/18 10/28/18 10/29/18





 06:59 06:59 06:59


 


Intake Total 251.2 3000 


 


Balance 251.2 3000 


 


Weight  162 lb 7.691 oz 











General appearance: PRESENT: no acute distress, well-developed, well-nourished


Head exam: PRESENT: atraumatic, normocephalic


Eye exam: PRESENT: conjunctiva pink, EOMI, PERRLA.  ABSENT: scleral icterus


Ear exam: PRESENT: normal external ear exam


Mouth exam: PRESENT: moist, tongue midline


Neck exam: ABSENT: carotid bruit, JVD, lymphadenopathy, thyromegaly


Respiratory exam: PRESENT: clear to auscultation anamaria.  ABSENT: rales, rhonchi, 

wheezes


Cardiovascular exam: PRESENT: RRR.  ABSENT: diastolic murmur, rubs, systolic 

murmur


Pulses: PRESENT: normal dorsalis pedis pul


GI/Abdominal exam: PRESENT: normal bowel sounds, soft.  ABSENT: distended, 

guarding, mass, organolmegaly, rebound, tenderness


Rectal exam: PRESENT: deferred


Neurological exam: PRESENT: alert, altered, awake, oriented to person





Results


Laboratory Results: 


 





 10/28/18 04:16 





 10/28/18 04:16 





 











  10/27/18 10/28/18 10/28/18





  08:50 04:16 04:16


 


WBC   8.4 


 


RBC   3.41 L 


 


Hgb   10.6 L 


 


Hct   30.6 L 


 


MCV   90 


 


MCH   31.1 


 


MCHC   34.7 


 


RDW   15.3 H 


 


Plt Count   198 


 


Seg Neutrophils %   85.4 H 


 


Lymphocytes %   10.7 L 


 


Monocytes %   3.8 


 


Eosinophils %   0.0 


 


Basophils %   0.1 


 


Absolute Neutrophils   7.1 


 


Absolute Lymphocytes   0.9 


 


Absolute Monocytes   0.3 


 


Absolute Eosinophils   0.0 


 


Absolute Basophils   0.0 


 


Sodium    142.5


 


Potassium    3.8


 


Chloride    112 H


 


Carbon Dioxide    18 L


 


Anion Gap    13


 


BUN    5 L


 


Creatinine    0.51 L


 


Est GFR ( Amer)    > 60


 


Est GFR (Non-Af Amer)    > 60


 


Glucose    97


 


Calcium    8.9


 


Urine Color  STRAW  


 


Urine Appearance  CLEAR  


 


Urine pH  7.0  


 


Ur Specific Gravity  1.015  


 


Urine Protein  NEGATIVE  


 


Urine Glucose (UA)  NEGATIVE  


 


Urine Ketones  NEGATIVE  


 


Urine Blood  NEGATIVE  


 


Urine Nitrite  NEGATIVE  


 


Ur Leukocyte Esterase  NEGATIVE  


 


Urine WBC (Auto)  1  











Impressions: 


 





Head CT  10/27/18 03:42


IMPRESSION: No acute intracranial abnormality. 


 














Assessment & Plan





- Diagnosis


(1) Alcohol withdrawal seizure


Qualifiers: 


   Complication of substance-induced condition: with delirium 


Is this a current diagnosis for this admission?: Yes   


Plan: 


No recurrence of seizure since admission to the floor. On Keppra. Continue 

ativan prn.





CT head was negative. UDS was positive for methadone. Patient's boyfriend does 

say she goes regularly to a methadone clinic for treatment of her history of 

opioid dependence.








(2) Alcohol withdrawal


Is this a current diagnosis for this admission?: Yes   


Plan: 


Ativan as needed. Continue banana bag.








- Time


Time Spent with patient: 15-24 minutes

## 2018-10-29 LAB
ADD MANUAL DIFF: NO
ANION GAP SERPL CALC-SCNC: 11 MMOL/L (ref 5–19)
BASOPHILS # BLD AUTO: 0 10^3/UL (ref 0–0.2)
BASOPHILS NFR BLD AUTO: 0.4 % (ref 0–2)
BUN SERPL-MCNC: 10 MG/DL (ref 7–20)
CALCIUM: 8.6 MG/DL (ref 8.4–10.2)
CHLORIDE SERPL-SCNC: 109 MMOL/L (ref 98–107)
CO2 SERPL-SCNC: 21 MMOL/L (ref 22–30)
EOSINOPHIL # BLD AUTO: 0 10^3/UL (ref 0–0.6)
EOSINOPHIL NFR BLD AUTO: 0.5 % (ref 0–6)
ERYTHROCYTE [DISTWIDTH] IN BLOOD BY AUTOMATED COUNT: 15.3 % (ref 11.5–14)
GLUCOSE SERPL-MCNC: 81 MG/DL (ref 75–110)
HCT VFR BLD CALC: 35.3 % (ref 36–47)
HGB BLD-MCNC: 12 G/DL (ref 12–15.5)
LYMPHOCYTES # BLD AUTO: 1.3 10^3/UL (ref 0.5–4.7)
LYMPHOCYTES NFR BLD AUTO: 19.2 % (ref 13–45)
MCH RBC QN AUTO: 30.9 PG (ref 27–33.4)
MCHC RBC AUTO-ENTMCNC: 34 G/DL (ref 32–36)
MCV RBC AUTO: 91 FL (ref 80–97)
MONOCYTES # BLD AUTO: 0.3 10^3/UL (ref 0.1–1.4)
MONOCYTES NFR BLD AUTO: 4.4 % (ref 3–13)
NEUTROPHILS # BLD AUTO: 5.3 10^3/UL (ref 1.7–8.2)
NEUTS SEG NFR BLD AUTO: 75.5 % (ref 42–78)
PLATELET # BLD: 197 10^3/UL (ref 150–450)
POTASSIUM SERPL-SCNC: 3.9 MMOL/L (ref 3.6–5)
RBC # BLD AUTO: 3.88 10^6/UL (ref 3.72–5.28)
SODIUM SERPL-SCNC: 140.8 MMOL/L (ref 137–145)
TOTAL CELLS COUNTED % (AUTO): 100 %
WBC # BLD AUTO: 7 10^3/UL (ref 4–10.5)

## 2018-10-29 RX ADMIN — DULOXETINE SCH MG: 30 CAPSULE, DELAYED RELEASE ORAL at 09:11

## 2018-10-29 RX ADMIN — HEPARIN SODIUM SCH UNIT: 5000 INJECTION, SOLUTION INTRAVENOUS; SUBCUTANEOUS at 21:52

## 2018-10-29 RX ADMIN — DOCUSATE SODIUM SCH: 100 CAPSULE, LIQUID FILLED ORAL at 18:58

## 2018-10-29 RX ADMIN — DOCUSATE SODIUM SCH: 100 CAPSULE, LIQUID FILLED ORAL at 11:05

## 2018-10-29 RX ADMIN — GABAPENTIN SCH MG: 400 CAPSULE ORAL at 13:49

## 2018-10-29 RX ADMIN — HEPARIN SODIUM SCH UNIT: 5000 INJECTION, SOLUTION INTRAVENOUS; SUBCUTANEOUS at 09:11

## 2018-10-29 RX ADMIN — FOLIC ACID SCH: 5 INJECTION, SOLUTION INTRAMUSCULAR; INTRAVENOUS; SUBCUTANEOUS at 18:59

## 2018-10-29 RX ADMIN — GABAPENTIN SCH MG: 400 CAPSULE ORAL at 09:10

## 2018-10-29 RX ADMIN — LORAZEPAM PRN MG: 2 INJECTION INTRAMUSCULAR; INTRAVENOUS at 21:53

## 2018-10-29 RX ADMIN — GABAPENTIN SCH MG: 400 CAPSULE ORAL at 18:54

## 2018-10-29 NOTE — PDOC PROGRESS REPORT
Subjective


Progress Note for:: 10/29/18


Subjective:: 





Patient is known to my practice and transferred to my hospital service earlier 

today. I had extensive discussion with her and mother in law at bedside during 

my evaluation. Both denied any significant alcohol ingestion before onset of 

seizure activities at about 2:30 am on Saturday,10/27/18. Patient had 3 

generalized seizure activities prior to EMS personnel arrival, one episode 

during EMS personnel evaluation and another episode while in the ED. She 

continue to demonstrate memory impairment with pre seizure onset period 

activities. She recently started attending Methadone clinic with escalation of 

her dosing by 5 mg every 2 days to preseizure dose of 65mg p.o daily. Patient 

has been on Duloxitine and Gapentin therapy for chronic pain management for 

awhile. No recurrent seizure since admission.


Reason For Visit: 


ETOH WITHDRAWAL SZ








Physical Exam


Vital Signs: 


 











Temp Pulse Resp BP Pulse Ox


 


 98.6 F   80   16   119/68   100 


 


 10/29/18 15:33  10/29/18 15:33  10/29/18 15:33  10/29/18 15:33  10/29/18 15:33








 Intake & Output











 10/28/18 10/29/18 10/30/18





 06:59 06:59 06:59


 


Intake Total 3000 397 1422


 


Balance 3000 397 1422


 


Weight 73.7 kg 73.8 kg 











General appearance: PRESENT: no acute distress, well-developed, well-nourished


Head exam: PRESENT: atraumatic, normocephalic


Eye exam: PRESENT: conjunctiva pink, EOMI, PERRLA.  ABSENT: scleral icterus


Ear exam: PRESENT: normal external ear exam


Mouth exam: PRESENT: moist, tongue midline - with healing bite injury on left 

lateral margin of the tongue


Respiratory exam: PRESENT: clear to auscultation anamaria


Cardiovascular exam: PRESENT: RRR.  ABSENT: diastolic murmur, rubs, systolic 

murmur


Vascular exam: PRESENT: normal capillary refill.  ABSENT: pallor


GI/Abdominal exam: PRESENT: normal bowel sounds, soft.  ABSENT: distended, 

guarding, mass, organolmegaly, rebound, tenderness


Extremities exam: ABSENT: pedal edema


Musculoskeletal exam: PRESENT: ambulatory


Neurological exam: PRESENT: alert, awake, oriented to person, oriented to time.

  ABSENT: oriented to place - not specific with names


Psychiatric exam: PRESENT: appropriate affect, normal mood.  ABSENT: homicidal 

ideation, suicidal ideation


Skin exam: PRESENT: dry, intact, warm.  ABSENT: cyanosis, rash





Results


Laboratory Results: 


 





 10/29/18 05:30 





 10/29/18 05:30 





 











  10/29/18 10/29/18





  05:30 05:30


 


WBC  7.0 


 


RBC  3.88 


 


Hgb  12.0 


 


Hct  35.3 L 


 


MCV  91 


 


MCH  30.9 


 


MCHC  34.0 


 


RDW  15.3 H 


 


Plt Count  197 


 


Seg Neutrophils %  75.5 


 


Lymphocytes %  19.2 


 


Monocytes %  4.4 


 


Eosinophils %  0.5 


 


Basophils %  0.4 


 


Absolute Neutrophils  5.3 


 


Absolute Lymphocytes  1.3 


 


Absolute Monocytes  0.3 


 


Absolute Eosinophils  0.0 


 


Absolute Basophils  0.0 


 


Sodium   140.8


 


Potassium   3.9


 


Chloride   109 H


 


Carbon Dioxide   21 L


 


Anion Gap   11


 


BUN   10


 


Creatinine   0.65


 


Est GFR ( Amer)   > 60


 


Est GFR (Non-Af Amer)   > 60


 


Glucose   81


 


Calcium   8.6











Impressions: 


 





Head CT  10/27/18 03:42


IMPRESSION: No acute intracranial abnormality. 


 














Assessment & Plan





- Diagnosis


(1) Generalized convulsive seizure


Is this a current diagnosis for this admission?: Yes   


Plan: 


Patient remain seizure free on current medication management. Her seizure could 

be due to decrease seizure threshold with drug-drug interactions including 

Gabapentin, Duloxetine and Methadone. Continue current management with care 

plan to coordinate with Methadone Clinic about her seizure activity and 

possible discontinuation of some of her medication.








(2) HTN (hypertension)


Qualifiers: 


   Hypertension type: essential hypertension   Qualified Code(s): I10 - 

Essential (primary) hypertension   


Is this a current diagnosis for this admission?: Yes   


Plan: 


Continue current management and monitor blood pressure closely.








(3) Cervical spondylosis with radiculopathy


Is this a current diagnosis for this admission?: Yes   


Plan: 


Maintain on Gabapentin for seizure and neuropathic pain management.








(4) Persistent insomnia


Is this a current diagnosis for this admission?: Yes   


Plan: 


Maintain on current medication management.








(5) Acne vulgaris


Is this a current diagnosis for this admission?: Yes   


Plan: 


Maintain on current medication management.








- Time


Time Spent with patient: 25-34 minutes


Medications reviewed and adjusted accordingly: Yes


Anticipated discharge: Home


Within: Other





- Inpatient Certification


Based on my medical assessment, after consideration of the patient's 

comorbidities, presenting symptoms, or acuity I expect that the services needed 

warrant INPATIENT care.: Yes


I certify that my determination is in accordance with my understanding of 

Medicare's requirements for reasonable and necessary INPATIENT services [42 CFR 

412.3e].: Yes


Medical Necessity: Need Close Monitoring Due to Risk of Patient Decompensation, 

Need For IV Fluids, Need For Continuous Telemetry Monitoring, Risk of 

Complication if Not Cared For in Hospital


Post Hospital Care: D/C Planner Documentation





- Plan Summary


Plan Summary: 





Continue current medication management. Start on Zbec vitamin 1 tablet po 

daily. I had extensive discussion with patient and mother-in-law a bedside.

## 2018-10-29 NOTE — PROGRESS NOTE
Provider Note


Provider Note: 


Patient apparently is Dr. Birmingham's patient. Will transfer service back to PCP.

## 2018-10-30 VITALS — DIASTOLIC BLOOD PRESSURE: 71 MMHG | SYSTOLIC BLOOD PRESSURE: 124 MMHG

## 2018-10-30 LAB
ADD MANUAL DIFF: NO
ANION GAP SERPL CALC-SCNC: 13 MMOL/L (ref 5–19)
BASOPHILS # BLD AUTO: 0.1 10^3/UL (ref 0–0.2)
BASOPHILS NFR BLD AUTO: 0.9 % (ref 0–2)
BUN SERPL-MCNC: 10 MG/DL (ref 7–20)
CALCIUM: 9.2 MG/DL (ref 8.4–10.2)
CHLORIDE SERPL-SCNC: 110 MMOL/L (ref 98–107)
CO2 SERPL-SCNC: 20 MMOL/L (ref 22–30)
EOSINOPHIL # BLD AUTO: 0.1 10^3/UL (ref 0–0.6)
EOSINOPHIL NFR BLD AUTO: 1.6 % (ref 0–6)
ERYTHROCYTE [DISTWIDTH] IN BLOOD BY AUTOMATED COUNT: 14.8 % (ref 11.5–14)
GLUCOSE SERPL-MCNC: 93 MG/DL (ref 75–110)
HCT VFR BLD CALC: 35.8 % (ref 36–47)
HGB BLD-MCNC: 12.3 G/DL (ref 12–15.5)
LYMPHOCYTES # BLD AUTO: 1.8 10^3/UL (ref 0.5–4.7)
LYMPHOCYTES NFR BLD AUTO: 27.9 % (ref 13–45)
MCH RBC QN AUTO: 31.1 PG (ref 27–33.4)
MCHC RBC AUTO-ENTMCNC: 34.3 G/DL (ref 32–36)
MCV RBC AUTO: 91 FL (ref 80–97)
MONOCYTES # BLD AUTO: 0.5 10^3/UL (ref 0.1–1.4)
MONOCYTES NFR BLD AUTO: 7.3 % (ref 3–13)
NEUTROPHILS # BLD AUTO: 4 10^3/UL (ref 1.7–8.2)
NEUTS SEG NFR BLD AUTO: 62.3 % (ref 42–78)
PLATELET # BLD: 232 10^3/UL (ref 150–450)
POTASSIUM SERPL-SCNC: 3.5 MMOL/L (ref 3.6–5)
RBC # BLD AUTO: 3.95 10^6/UL (ref 3.72–5.28)
SODIUM SERPL-SCNC: 142.7 MMOL/L (ref 137–145)
TOTAL CELLS COUNTED % (AUTO): 100 %
WBC # BLD AUTO: 6.5 10^3/UL (ref 4–10.5)

## 2018-10-30 RX ADMIN — LORAZEPAM PRN MG: 2 INJECTION INTRAMUSCULAR; INTRAVENOUS at 12:32

## 2018-10-30 RX ADMIN — DULOXETINE SCH MG: 30 CAPSULE, DELAYED RELEASE ORAL at 09:06

## 2018-10-30 RX ADMIN — DOCUSATE SODIUM SCH: 100 CAPSULE, LIQUID FILLED ORAL at 09:02

## 2018-10-30 RX ADMIN — HEPARIN SODIUM SCH UNIT: 5000 INJECTION, SOLUTION INTRAVENOUS; SUBCUTANEOUS at 09:07

## 2018-10-30 RX ADMIN — GABAPENTIN SCH MG: 400 CAPSULE ORAL at 09:05

## 2018-10-30 RX ADMIN — LORAZEPAM PRN MG: 2 INJECTION INTRAMUSCULAR; INTRAVENOUS at 10:26

## 2018-10-30 RX ADMIN — GABAPENTIN SCH MG: 400 CAPSULE ORAL at 13:41

## 2018-10-30 RX ADMIN — NICOTINE SCH EACH: 21 PATCH, EXTENDED RELEASE TOPICAL at 10:01

## 2018-10-30 RX ADMIN — NICOTINE SCH: 21 PATCH, EXTENDED RELEASE TOPICAL at 09:12

## 2018-10-30 RX ADMIN — LORAZEPAM PRN MG: 2 INJECTION INTRAMUSCULAR; INTRAVENOUS at 04:34

## 2018-10-30 RX ADMIN — LORAZEPAM PRN MG: 2 INJECTION INTRAMUSCULAR; INTRAVENOUS at 08:02

## 2018-10-30 NOTE — PDOC DISCHARGE SUMMARY
General





- Admit/Disc Date/PCP


Admission Date/Primary Care Provider: 


  10/27/18 05:30





  AIRAM PEREZ





Discharge Date: 10/30/18





- Discharge Diagnosis


(1) Generalized convulsive seizure


Is this a current diagnosis for this admission?: Yes   





(2) HTN (hypertension)


Is this a current diagnosis for this admission?: Yes   





(3) Cervical spondylosis with radiculopathy


Is this a current diagnosis for this admission?: Yes   





(4) Persistent insomnia


Is this a current diagnosis for this admission?: Yes   





(5) Acne vulgaris


Is this a current diagnosis for this admission?: Yes   





- Additional Information


Resuscitation Status: Full Code


Home Medications: 








Duloxetine HCl [Cymbalta] 60 mg PO DAILY 10/27/18 


Gabapentin [Neurontin] 800 mg PO TID 10/27/18 


Losartan Potassium [Cozaar 50 mg Tablet] 50 mg PO DAILY 10/29/18 


Mirtazapine [Remeron] 30 mg PO DAILY 10/29/18 











History of Present Illness


Patient complains of: seizure activity


History of Present Illness: 


LINWOOD PAGE is a 43 year old female with a past medical history of alcohol 

binging, alcohol withdrawal seizure and depression.  History is obtained by the 

record as she is sedated.  Patient presents after 3 seizures following abrupt 

alcohol cessation, last drink 48 hours ago.  EMS witnessed a seizure for which 

she received Versed, she was combative in postictal state prompting Haldol and 

Benadryl.  She denies fever, headache, recent illness.  She admits to remote 

discontinuation of Keppra.


In the emergency room she has another witnessed seizure with urinary 

incontinence and tongue biting, prompting 2 mg of Ativan and subsequently 

aborted within 1 minute.  





Hospital Course


Hospital Course: 





Patient was managed with seizure protocol include us of IV Lorazepam and 

restarted on  Gabapentin and Duloxetine. Her Methadone was held during this 

hospitalization. She continue to demonstrated post ictal memory impairment and 

disorientation. I had extensive discussion with her and her fiance mother at 

bedside denying alcohol abuse. Patient have been attending [facility] 

with recent escalation of her Methadone dosage to 65 mg prior to her seizure 

recurrent event. Her seizure activity most likely consequence of decrease 

seizure threshold from oliver-drug interaction of Gabapentin, Duloxetine and 

Methadone usage. Patient was instructed to inform her Methadone management team 

about this possibility and consider alternative treatment. She signed out 

against medical advise earlier today. In view of her action, she will be given 

30 days notice of discharge from my practice from 10/31/2018. I will continue 

to provide medical care as her PCP until expiration of this time. She will be 

encourage to seek another PCP during this transition period.





Physical Exam


Vital Signs: 


 











Temp Pulse Resp BP Pulse Ox


 


 98.4 F   108 H  12   124/71   100 


 


 10/30/18 15:06  10/30/18 15:06  10/30/18 15:06  10/30/18 15:06  10/30/18 15:06








 Intake & Output











 10/29/18 10/30/18 10/31/18





 06:59 06:59 06:59


 


Intake Total 397 1659 591


 


Output Total  0 


 


Balance 397 1659 591


 


Weight 73.8 kg 73.8 kg 














Results


Laboratory Results: 


 





 10/30/18 05:17 





 10/30/18 05:17 





 











  10/30/18 10/30/18





  05:17 05:17


 


WBC  6.5 


 


RBC  3.95 


 


Hgb  12.3 


 


Hct  35.8 L 


 


MCV  91 


 


MCH  31.1 


 


MCHC  34.3 


 


RDW  14.8 H 


 


Plt Count  232 


 


Seg Neutrophils %  62.3 


 


Lymphocytes %  27.9 


 


Monocytes %  7.3 


 


Eosinophils %  1.6 


 


Basophils %  0.9 


 


Absolute Neutrophils  4.0 


 


Absolute Lymphocytes  1.8 


 


Absolute Monocytes  0.5 


 


Absolute Eosinophils  0.1 


 


Absolute Basophils  0.1 


 


Sodium   142.7


 


Potassium   3.5 L


 


Chloride   110 H


 


Carbon Dioxide   20 L


 


Anion Gap   13


 


BUN   10


 


Creatinine   0.66


 


Est GFR ( Amer)   > 60


 


Est GFR (Non-Af Amer)   > 60


 


Glucose   93


 


Calcium   9.2











Impressions: 


 





Head CT  10/27/18 03:42


IMPRESSION: No acute intracranial abnormality. 


 














Qualifiers





- *


PATIENT BEING DISCHARGED WITH ANY OF THE FOLLOWING DIAGNOSIS: No





Plan


Discharge Plan: 





D/C home against medical advised. Patient will be issued 30 days notice of 

discharge from my practice from 10/31/2018.